# Patient Record
Sex: MALE | Race: WHITE | NOT HISPANIC OR LATINO | Employment: FULL TIME | URBAN - METROPOLITAN AREA
[De-identification: names, ages, dates, MRNs, and addresses within clinical notes are randomized per-mention and may not be internally consistent; named-entity substitution may affect disease eponyms.]

---

## 2024-02-28 ENCOUNTER — APPOINTMENT (OUTPATIENT)
Dept: RADIOLOGY | Facility: CLINIC | Age: 54
End: 2024-02-28
Payer: COMMERCIAL

## 2024-02-28 ENCOUNTER — OFFICE VISIT (OUTPATIENT)
Dept: URGENT CARE | Facility: CLINIC | Age: 54
End: 2024-02-28
Payer: COMMERCIAL

## 2024-02-28 VITALS
TEMPERATURE: 98.9 F | SYSTOLIC BLOOD PRESSURE: 170 MMHG | RESPIRATION RATE: 18 BRPM | HEART RATE: 73 BPM | DIASTOLIC BLOOD PRESSURE: 110 MMHG | OXYGEN SATURATION: 98 %

## 2024-02-28 DIAGNOSIS — M25.512 ACUTE PAIN OF LEFT SHOULDER: Primary | ICD-10-CM

## 2024-02-28 DIAGNOSIS — M25.512 ACUTE PAIN OF LEFT SHOULDER: ICD-10-CM

## 2024-02-28 PROCEDURE — G0382 LEV 3 HOSP TYPE B ED VISIT: HCPCS

## 2024-02-28 PROCEDURE — 73030 X-RAY EXAM OF SHOULDER: CPT

## 2024-02-28 RX ORDER — ASCORBIC ACID 1000 MG
TABLET ORAL
COMMUNITY

## 2024-02-28 RX ORDER — ZINC SULFATE 66 MG
TABLET ORAL
COMMUNITY

## 2024-02-28 NOTE — PATIENT INSTRUCTIONS
Check my chart for final radiology results.  Alternate between ice and heat.  Tylenol/motrin as needed.  Light stretching.   Follow up with ortho if no improvement in 5 days.  Follow up with physical therapy.    Follow up with PCP in 3-5 days.  Proceed to the ER with worsening symptoms.     Early Postoperative or Post Injury Shoulder Exercises   AMBULATORY CARE:   Before you start an exercise program after an injury or surgery:  You may need to wait until your swelling and pain have gone down before you start to exercise. Do not start an exercise program before you talk to your healthcare provider.  Before you exercise:  Warm up and stretch before you exercise. Walk or ride a stationary bike for 5 to 10 minutes to help you warm up. Stretching helps increase range of motion. It may also decrease muscle soreness and help prevent another injury. Your healthcare provider or physical therapist will tell you which of the following stretches to do:  Crossover arm stretch:  Relax your shoulders. Hold your upper arm with the opposite hand. Pull your arm across your chest until you feel a stretch. Hold the stretch for 30 seconds. Return to the starting position.         Shoulder flexion stretch:  Stand facing a wall. Slowly walk your fingers up the wall until you feel a stretch. Hold the stretch for 30 seconds. Return to the starting position.         Sleeper stretch:  Lie on your injured side on a firm, flat surface. Bend the elbow of your injured arm 90° with your hand facing up. Use your arm that is not injured to slowly push your injured arm down. Stop when you feel a stretch at the back of your injured shoulder. Hold the stretch for 30 seconds. Slowly return to the starting position.       Call your doctor or physical therapist if:   You have sharp or worsening pain during exercise or at rest.    You have questions or concerns about your shoulder exercises.    How to perform exercises without a weight or an exercise  band:   Pendulum swings:  Lean forward and rest the arm that is not injured on a table. Do not round your back or lock your knees during the exercise. Let your other arm hang freely by your side. Gently swing your injured arm forward and backward, side to side, and in circles.         Shrugs:  Stand with your arms by your side. Gently lift your shoulders up to your ears and hold for 5 seconds. With your shoulders lifted, pinch your shoulder blades together. Hold for 5 seconds. Slowly return to the starting position.       How to exercise with a weight:  Your healthcare provider or physical therapist will tell you how much weight to use. You may need to start with a light weight and work up to heavier weights. Hold a weight with your arm slightly in front of your body. Slowly raise your arm to the side with your thumb pointing up or down as directed. Stop when you reach the level of your shoulder. Hold for as many seconds as directed. Slowly return to the starting position.  How to exercise with an exercise band:  Your healthcare provider or physical therapist will tell you how much resistance to use.  Hold the exercise band with both hands in front of your body. Slowly raise your injured arm up and to the side with your thumb pointing up or down as directed. Stop when you reach the level of your shoulder. Hold for as many seconds as directed. Slowly return to the starting position.    Tie one end of the exercise band to a heavy object. Stand and hold the band in your hand. Bend your elbow. Keep your arm close to your side and pull the band straight back. Squeeze your shoulder blades together as you pull. Slowly return to the starting position.    Follow up with your doctor or physical therapist as directed:  Write down your questions so you remember to ask them at your visits.  © Copyright [employer] 2023 Information is for End User's use only and may not be sold, redistributed or otherwise used for commercial  purposes.  The above information is an  only. It is not intended as medical advice for individual conditions or treatments. Talk to your doctor, nurse or pharmacist before following any medical regimen to see if it is safe and effective for you.

## 2024-02-28 NOTE — PROGRESS NOTES
"  [facility]        NAME: Alli Garcia is a 53 y.o. male  : 1970    MRN: [id number]  DATE: 2024  TIME: 4:00 PM    Assessment and Plan   Acute pain of left shoulder [M25.512]  1. Acute pain of left shoulder  XR shoulder 2+ vw left    Ambulatory referral to Physical Therapy    Ambulatory Referral to Orthopedic Surgery        Xray appears negative for any fracture. Will follow up with radiologist report when available.     Patient Instructions     Check my chart for final radiology results.  Alternate between ice and heat.  Tylenol/motrin as needed.  Light stretching.   Follow up with ortho if no improvement in 5 days.  Follow up with physical therapy.    Follow up with PCP in 3-5 days.  Proceed to the ER with worsening symptoms.     Chief Complaint     Chief Complaint   Patient presents with    Shoulder Pain     Injured shoulder while lifting weights yesterday and heard a \"crunch\" now has pain. Taking motrin.  Iced and provided relief.          History of Present Illness       The patient presents today with complaints of L shoulder pain that started yesterday. He was doing a shoulder press and when he brought the weight down he felt a crunch followed by pain. Afterwards he noted decreased AROM due to the pain. Today he feels the pain is slightly improved. He has been using ice and taking ibuprofen as needed. Denies previous injury/surgery to his L shoulder.         Review of Systems   Review of Systems   Constitutional:  Negative for chills and fever.   Musculoskeletal:  Positive for arthralgias (L shoulder).         Current Medications       Current Outpatient Medications:     Coenzyme Q10 (Co Q 10) 10 MG CAPS, Take by mouth, Disp: , Rfl:     Zinc Sulfate (Zinc 15) 66 MG TABS, Take by mouth, Disp: , Rfl:     Current Allergies     Allergies as of 2024 - Reviewed 2024   Allergen Reaction Noted    Penicillins Rash 2024            The following portions of the patient's " history were reviewed and updated as appropriate: allergies, current medications, past family history, past medical history, past social history, past surgical history and problem list.     History reviewed. No pertinent past medical history.    History reviewed. No pertinent surgical history.    History reviewed. No pertinent family history.      Medications have been verified.        Objective   BP (!) 170/110 Comment: manual  Pulse 73   Temp 98.9 °F (37.2 °C)   Resp 18   SpO2 98%        Physical Exam     Physical Exam  Vitals and nursing note reviewed.   Constitutional:       General: He is not in acute distress.     Appearance: Normal appearance. He is not ill-appearing.   HENT:      Head: Normocephalic and atraumatic.      Right Ear: Tympanic membrane normal.      Left Ear: Tympanic membrane normal.      Nose: Nose normal.      Mouth/Throat:      Lips: Pink.      Mouth: Mucous membranes are moist.   Eyes:      General: Vision grossly intact.      Extraocular Movements: Extraocular movements intact.      Pupils: Pupils are equal, round, and reactive to light.   Cardiovascular:      Rate and Rhythm: Normal rate.   Pulmonary:      Effort: Pulmonary effort is normal.   Musculoskeletal:      Right shoulder: Normal.      Left shoulder: Tenderness present. No swelling, deformity or bony tenderness. Decreased range of motion. Normal strength. Normal pulse.      Cervical back: Normal range of motion.      Comments: Full AROM with flexion, extension, abduction, adduction, internal rotation with  minimal pain. Limited external rotation. 5/5  strength. Sensation intact.      Lymphadenopathy:      Cervical: No cervical adenopathy.   Skin:     General: Skin is warm.      Findings: No rash.   Neurological:      Mental Status: He is alert and oriented to person, place, and time.      Motor: Motor function is intact.      Gait: Gait is intact.   Psychiatric:         Attention and Perception: Attention normal.          Mood and Affect: Mood normal.

## 2024-11-13 ENCOUNTER — APPOINTMENT (OUTPATIENT)
Dept: LAB | Facility: CLINIC | Age: 54
End: 2024-11-13
Payer: COMMERCIAL

## 2024-11-13 ENCOUNTER — OFFICE VISIT (OUTPATIENT)
Dept: FAMILY MEDICINE CLINIC | Facility: CLINIC | Age: 54
End: 2024-11-13
Payer: COMMERCIAL

## 2024-11-13 VITALS
DIASTOLIC BLOOD PRESSURE: 98 MMHG | SYSTOLIC BLOOD PRESSURE: 150 MMHG | HEIGHT: 74 IN | HEART RATE: 93 BPM | WEIGHT: 220 LBS | BODY MASS INDEX: 28.23 KG/M2 | OXYGEN SATURATION: 99 % | TEMPERATURE: 99.2 F | RESPIRATION RATE: 18 BRPM

## 2024-11-13 DIAGNOSIS — Z00.00 ROUTINE PHYSICAL EXAMINATION: ICD-10-CM

## 2024-11-13 DIAGNOSIS — Z23 ENCOUNTER FOR IMMUNIZATION: ICD-10-CM

## 2024-11-13 DIAGNOSIS — M25.512 CHRONIC LEFT SHOULDER PAIN: ICD-10-CM

## 2024-11-13 DIAGNOSIS — Z12.5 PROSTATE CANCER SCREENING: ICD-10-CM

## 2024-11-13 DIAGNOSIS — Z00.00 ROUTINE PHYSICAL EXAMINATION: Primary | ICD-10-CM

## 2024-11-13 DIAGNOSIS — Z13.220 LIPID SCREENING: ICD-10-CM

## 2024-11-13 DIAGNOSIS — Z13.1 SCREENING FOR DIABETES MELLITUS: ICD-10-CM

## 2024-11-13 DIAGNOSIS — Z12.11 SCREENING FOR COLON CANCER: ICD-10-CM

## 2024-11-13 DIAGNOSIS — G89.29 CHRONIC LEFT SHOULDER PAIN: ICD-10-CM

## 2024-11-13 DIAGNOSIS — R03.0 ELEVATED BP WITHOUT DIAGNOSIS OF HYPERTENSION: ICD-10-CM

## 2024-11-13 LAB
ALBUMIN SERPL BCG-MCNC: 4.2 G/DL (ref 3.5–5)
ALP SERPL-CCNC: 63 U/L (ref 34–104)
ALT SERPL W P-5'-P-CCNC: 24 U/L (ref 7–52)
ANION GAP SERPL CALCULATED.3IONS-SCNC: 8 MMOL/L (ref 4–13)
AST SERPL W P-5'-P-CCNC: 23 U/L (ref 13–39)
BASOPHILS # BLD AUTO: 0.02 THOUSANDS/ÂΜL (ref 0–0.1)
BASOPHILS NFR BLD AUTO: 0 % (ref 0–1)
BILIRUB SERPL-MCNC: 0.75 MG/DL (ref 0.2–1)
BUN SERPL-MCNC: 20 MG/DL (ref 5–25)
CALCIUM SERPL-MCNC: 9.2 MG/DL (ref 8.4–10.2)
CHLORIDE SERPL-SCNC: 103 MMOL/L (ref 96–108)
CHOLEST SERPL-MCNC: 171 MG/DL (ref ?–200)
CO2 SERPL-SCNC: 28 MMOL/L (ref 21–32)
CREAT SERPL-MCNC: 1.56 MG/DL (ref 0.6–1.3)
EOSINOPHIL # BLD AUTO: 0.11 THOUSAND/ÂΜL (ref 0–0.61)
EOSINOPHIL NFR BLD AUTO: 2 % (ref 0–6)
ERYTHROCYTE [DISTWIDTH] IN BLOOD BY AUTOMATED COUNT: 12.3 % (ref 11.6–15.1)
EST. AVERAGE GLUCOSE BLD GHB EST-MCNC: 103 MG/DL
GFR SERPL CREATININE-BSD FRML MDRD: 50 ML/MIN/1.73SQ M
GLUCOSE P FAST SERPL-MCNC: 95 MG/DL (ref 65–99)
HBA1C MFR BLD: 5.2 %
HCT VFR BLD AUTO: 47.2 % (ref 36.5–49.3)
HDLC SERPL-MCNC: 45 MG/DL
HGB BLD-MCNC: 16.5 G/DL (ref 12–17)
IMM GRANULOCYTES # BLD AUTO: 0.02 THOUSAND/UL (ref 0–0.2)
IMM GRANULOCYTES NFR BLD AUTO: 0 % (ref 0–2)
LDLC SERPL CALC-MCNC: 106 MG/DL (ref 0–100)
LYMPHOCYTES # BLD AUTO: 1.61 THOUSANDS/ÂΜL (ref 0.6–4.47)
LYMPHOCYTES NFR BLD AUTO: 27 % (ref 14–44)
MCH RBC QN AUTO: 31.5 PG (ref 26.8–34.3)
MCHC RBC AUTO-ENTMCNC: 35 G/DL (ref 31.4–37.4)
MCV RBC AUTO: 90 FL (ref 82–98)
MONOCYTES # BLD AUTO: 0.8 THOUSAND/ÂΜL (ref 0.17–1.22)
MONOCYTES NFR BLD AUTO: 14 % (ref 4–12)
NEUTROPHILS # BLD AUTO: 3.33 THOUSANDS/ÂΜL (ref 1.85–7.62)
NEUTS SEG NFR BLD AUTO: 57 % (ref 43–75)
NONHDLC SERPL-MCNC: 126 MG/DL
NRBC BLD AUTO-RTO: 0 /100 WBCS
PLATELET # BLD AUTO: 239 THOUSANDS/UL (ref 149–390)
PMV BLD AUTO: 11 FL (ref 8.9–12.7)
POTASSIUM SERPL-SCNC: 3.9 MMOL/L (ref 3.5–5.3)
PROT SERPL-MCNC: 7.1 G/DL (ref 6.4–8.4)
PSA SERPL-MCNC: 1.3 NG/ML (ref 0–4)
RBC # BLD AUTO: 5.23 MILLION/UL (ref 3.88–5.62)
SODIUM SERPL-SCNC: 139 MMOL/L (ref 135–147)
TRIGL SERPL-MCNC: 102 MG/DL (ref ?–150)
WBC # BLD AUTO: 5.89 THOUSAND/UL (ref 4.31–10.16)

## 2024-11-13 PROCEDURE — 36415 COLL VENOUS BLD VENIPUNCTURE: CPT

## 2024-11-13 PROCEDURE — 80061 LIPID PANEL: CPT

## 2024-11-13 PROCEDURE — 83036 HEMOGLOBIN GLYCOSYLATED A1C: CPT

## 2024-11-13 PROCEDURE — 99386 PREV VISIT NEW AGE 40-64: CPT

## 2024-11-13 PROCEDURE — 90471 IMMUNIZATION ADMIN: CPT

## 2024-11-13 PROCEDURE — 90715 TDAP VACCINE 7 YRS/> IM: CPT

## 2024-11-13 PROCEDURE — 85025 COMPLETE CBC W/AUTO DIFF WBC: CPT

## 2024-11-13 PROCEDURE — 80053 COMPREHEN METABOLIC PANEL: CPT

## 2024-11-13 PROCEDURE — G0103 PSA SCREENING: HCPCS

## 2024-11-13 RX ORDER — MULTIVIT WITH MINERALS/LUTEIN
1000 TABLET ORAL DAILY
COMMUNITY

## 2024-11-13 NOTE — ASSESSMENT & PLAN NOTE
"- BP elevated today, 150/98 mmHg   - patient takes his BP weekly at home and reports it is always 130s/70s and that he has \"white coat HTN\" at office visits  - I advised him to take his BP daily for the next two weeks and keep log of it; advised to send me the BP log via [employer] in two weeks  - if consistently above goal of 140/90, will need to consider starting anti-hypertensive medication        "

## 2024-11-13 NOTE — ASSESSMENT & PLAN NOTE
- chronic left shoulder pain x 8 months -- intermittent, not interested in MRI at this time  - placed referral to orthopedics for further evaluation     Orders:    Ambulatory Referral to Orthopedic Surgery; Future

## 2024-11-13 NOTE — PROGRESS NOTES
"Adult Annual Physical  Name: Alli Garcia      : 1970      MRN: [id number]  Encounter Provider: Rehana Kirkland PA-C  Encounter Date: 2024   Encounter department: [facility]    Assessment & Plan  Routine physical examination  - physical exam unremarkable  - ordered routine labs to be completed  - ordered cologuard for colon cancer screening  - ordered PSA for prostate cancer screening  - recommended yearly annual follow up; sooner as needed  - updated TDAP today; pt reports he has already gotten his flu shot this year     Orders:    CBC and differential; Future    Comprehensive metabolic panel; Future    Chronic left shoulder pain  - chronic left shoulder pain x 8 months -- intermittent, not interested in MRI at this time  - placed referral to orthopedics for further evaluation     Orders:    Ambulatory Referral to Orthopedic Surgery; Future    Elevated BP without diagnosis of hypertension  - BP elevated today, 150/98 mmHg   - patient takes his BP weekly at home and reports it is always 130s/70s and that he has \"white coat HTN\" at office visits  - I advised him to take his BP daily for the next two weeks and keep log of it; advised to send me the BP log via [employer] in two weeks  - if consistently above goal of 140/90, will need to consider starting anti-hypertensive medication        Screening for colon cancer    Orders:    Cologuard    Encounter for immunization    Orders:    TDAP VACCINE GREATER THAN OR EQUAL TO 6YO IM    Prostate cancer screening    Orders:    PSA, Total Screen; Future    Lipid screening    Orders:    Lipid panel; Future    Screening for diabetes mellitus    Orders:    Hemoglobin A1C; Future    Immunizations and preventive care screenings were discussed with patient today. Appropriate education was printed on patient's after visit summary.      Counseling:  Dental Health: discussed importance of regular tooth brushing, flossing, and dental visits.  Exercise: " the importance of regular exercise/physical activity was discussed. Recommend exercise 3-5 times per week for at least 30 minutes.          History of Present Illness       Patient presents to establish care today.   He has no chronic medical conditions; takes no daily medication.   He denies tobacco use.     No acute complaints to discuss. He does have chronic left shoulder pain -- he saw  about 8 months ago who diagnosed him with a partial tear of the rotator cuff; however he never followed up with ortho for it. Reports pain is not an issue anymore but it does bother him intermittently after overuse. He would be interested in seeing orthopedics.     Otherwise, he does get intermittent sciatica pain but no pain currently.     Adult Annual Physical:  Patient presents for annual physical.     Diet and Physical Activity:  - Diet/Nutrition: well balanced diet and consuming 3-5 servings of fruits/vegetables daily.  - Exercise: walking and moderate cardiovascular exercise.    Depression Screening:  - PHQ-2 Score: 0    General Health:  - Sleep: sleeps well.  - Hearing: normal hearing bilateral ears.  - Vision: wears contacts and goes for regular eye exams.  - Dental: brushes teeth twice daily and regular dental visits.     Health:    - Urinary symptoms: none.     Review of Systems   Constitutional:  Negative for chills, diaphoresis and fever.   Respiratory:  Negative for cough, chest tightness, shortness of breath and wheezing.    Cardiovascular:  Negative for chest pain and palpitations.   Gastrointestinal:  Negative for abdominal pain, blood in stool, constipation, diarrhea, nausea and vomiting.   Musculoskeletal:  Positive for arthralgias.   Neurological:  Negative for dizziness, light-headedness and headaches.   Psychiatric/Behavioral:  Negative for sleep disturbance.      Medical History Reviewed by provider this encounter:       Past Medical History   Past Medical History:   Diagnosis Date    Allergic N/A     "Penicillin    Pneumonia 2002     No past surgical history on file.  Family History   Problem Relation Age of Onset    Arthritis Mother     Hypertension Maternal Grandfather     Cancer Maternal Grandfather     Stroke Maternal Grandmother     Diabetes Maternal Grandmother     Arthritis Maternal Grandmother     Hypertension Brother      Current Outpatient Medications on File Prior to Visit   Medication Sig Dispense Refill    Ascorbic Acid (vitamin C) 1000 MG tablet Take 1,000 mg by mouth daily      Prenatal Vit-Fe Fumarate-FA (M-VIT PO) Take by mouth      Zinc Sulfate (Zinc 15) 66 MG TABS Take by mouth      Coenzyme Q10 (Co Q 10) 10 MG CAPS Take by mouth       No current facility-administered medications on file prior to visit.     Allergies   Allergen Reactions    Penicillins Rash      Current Outpatient Medications on File Prior to Visit   Medication Sig Dispense Refill    Ascorbic Acid (vitamin C) 1000 MG tablet Take 1,000 mg by mouth daily      Prenatal Vit-Fe Fumarate-FA (M-VIT PO) Take by mouth      Zinc Sulfate (Zinc 15) 66 MG TABS Take by mouth      Coenzyme Q10 (Co Q 10) 10 MG CAPS Take by mouth       No current facility-administered medications on file prior to visit.      Social History     Tobacco Use    Smoking status: Never    Smokeless tobacco: Former     Types: Chew     Quit date: 9/1/1992    Tobacco comments:     Chewed tobacco for a few months in college playing baseball.   Vaping Use    Vaping status: Never Used   Substance and Sexual Activity    Alcohol use: Yes     Alcohol/week: 2.0 standard drinks of alcohol     Types: 2 Cans of beer per week    Drug use: Never    Sexual activity: Yes     Partners: Female     Birth control/protection: Condom Male       Objective     /98   Pulse 93   Temp 99.2 °F (37.3 °C)   Resp 18   Ht 6' 2\" (1.88 m)   Wt 99.8 kg (220 lb)   SpO2 99%   BMI 28.25 kg/m²     Physical Exam  Vitals reviewed.   Constitutional:       General: He is not in acute distress.     " Appearance: Normal appearance. He is not ill-appearing or diaphoretic.   HENT:      Head: Normocephalic and atraumatic.      Right Ear: Tympanic membrane, ear canal and external ear normal. There is no impacted cerumen.      Left Ear: Tympanic membrane, ear canal and external ear normal. There is no impacted cerumen.      Nose: Nose normal. No congestion or rhinorrhea.      Mouth/Throat:      Mouth: Mucous membranes are moist.      Pharynx: Oropharynx is clear. No oropharyngeal exudate or posterior oropharyngeal erythema.   Eyes:      General:         Right eye: No discharge.         Left eye: No discharge.      Conjunctiva/sclera: Conjunctivae normal.   Cardiovascular:      Rate and Rhythm: Normal rate and regular rhythm.      Pulses: Normal pulses.      Heart sounds: Normal heart sounds. No murmur heard.  Pulmonary:      Effort: Pulmonary effort is normal. No respiratory distress.      Breath sounds: Normal breath sounds. No wheezing, rhonchi or rales.   Abdominal:      General: Bowel sounds are normal. There is no distension.      Palpations: Abdomen is soft.      Tenderness: There is no abdominal tenderness.   Musculoskeletal:         General: Normal range of motion.      Cervical back: Normal range of motion and neck supple.      Right lower leg: No edema.      Left lower leg: No edema.   Lymphadenopathy:      Cervical: No cervical adenopathy.   Skin:     General: Skin is warm.   Neurological:      General: No focal deficit present.      Mental Status: He is alert.      Gait: Gait normal.   Psychiatric:         Mood and Affect: Mood normal.

## 2024-11-14 ENCOUNTER — RESULTS FOLLOW-UP (OUTPATIENT)
Dept: FAMILY MEDICINE CLINIC | Facility: CLINIC | Age: 54
End: 2024-11-14

## 2024-11-14 DIAGNOSIS — R79.89 ELEVATED SERUM CREATININE: Primary | ICD-10-CM

## 2024-11-27 ENCOUNTER — APPOINTMENT (OUTPATIENT)
Dept: LAB | Facility: CLINIC | Age: 54
End: 2024-11-27
Payer: COMMERCIAL

## 2024-11-27 DIAGNOSIS — R79.89 ELEVATED SERUM CREATININE: ICD-10-CM

## 2024-11-27 LAB
ANION GAP SERPL CALCULATED.3IONS-SCNC: 8 MMOL/L (ref 4–13)
BUN SERPL-MCNC: 20 MG/DL (ref 5–25)
CALCIUM SERPL-MCNC: 9.3 MG/DL (ref 8.4–10.2)
CHLORIDE SERPL-SCNC: 102 MMOL/L (ref 96–108)
CO2 SERPL-SCNC: 27 MMOL/L (ref 21–32)
CREAT SERPL-MCNC: 1.58 MG/DL (ref 0.6–1.3)
GFR SERPL CREATININE-BSD FRML MDRD: 49 ML/MIN/1.73SQ M
GLUCOSE P FAST SERPL-MCNC: 90 MG/DL (ref 65–99)
POTASSIUM SERPL-SCNC: 4.1 MMOL/L (ref 3.5–5.3)
SODIUM SERPL-SCNC: 137 MMOL/L (ref 135–147)

## 2024-11-27 PROCEDURE — 80048 BASIC METABOLIC PNL TOTAL CA: CPT

## 2024-11-27 PROCEDURE — 36415 COLL VENOUS BLD VENIPUNCTURE: CPT

## 2024-11-29 ENCOUNTER — TELEPHONE (OUTPATIENT)
Age: 54
End: 2024-11-29

## 2024-11-29 ENCOUNTER — RESULTS FOLLOW-UP (OUTPATIENT)
Dept: FAMILY MEDICINE CLINIC | Facility: CLINIC | Age: 54
End: 2024-11-29

## 2024-12-01 LAB — COLOGUARD RESULT REPORTABLE: NEGATIVE

## 2024-12-02 ENCOUNTER — DOCUMENTATION (OUTPATIENT)
Dept: ADMINISTRATIVE | Facility: OTHER | Age: 54
End: 2024-12-02

## 2024-12-02 NOTE — PROGRESS NOTES
12/02/24 11:50 AM    Patient Reported home blood pressure(s) documented in chart.    Thank you.  Tamiko Ribera MA  [facility]              Patients reported BP log for two weeks          Note        Alli Garcia [phone]  Elena Purdy MA3 days ago           Recent Patient Communication     Last Update Description Specialty     3 days ago Patient Reported Blood Pressure Family Medicine     [facility] Elena Purdy Closed

## 2024-12-03 VITALS — DIASTOLIC BLOOD PRESSURE: 79 MMHG | SYSTOLIC BLOOD PRESSURE: 130 MMHG

## 2024-12-03 NOTE — PROGRESS NOTES
Reviewed BP log, all readings are within goal of < 140/90 therefore we can continue to monitor off antihypertensive medication. Let me know if he has any questions!